# Patient Record
Sex: MALE | Race: WHITE
[De-identification: names, ages, dates, MRNs, and addresses within clinical notes are randomized per-mention and may not be internally consistent; named-entity substitution may affect disease eponyms.]

---

## 2019-01-09 ENCOUNTER — HOSPITAL ENCOUNTER (OUTPATIENT)
Dept: HOSPITAL 97 - OR | Age: 59
Discharge: HOME | End: 2019-01-09
Attending: SURGERY
Payer: COMMERCIAL

## 2019-01-09 VITALS — OXYGEN SATURATION: 95 % | SYSTOLIC BLOOD PRESSURE: 128 MMHG | DIASTOLIC BLOOD PRESSURE: 73 MMHG

## 2019-01-09 VITALS — TEMPERATURE: 97.4 F

## 2019-01-09 DIAGNOSIS — I10: ICD-10-CM

## 2019-01-09 DIAGNOSIS — C44.699: Primary | ICD-10-CM

## 2019-01-09 PROCEDURE — 88305 TISSUE EXAM BY PATHOLOGIST: CPT

## 2019-01-09 PROCEDURE — 0JBF0ZZ EXCISION OF LEFT UPPER ARM SUBCUTANEOUS TISSUE AND FASCIA, OPEN APPROACH: ICD-10-PCS

## 2019-01-09 NOTE — OP
Date of Procedure:  01/09/2019



Surgeon:  Osbaldo Richards MD



Preoperative Diagnosis:  Dermatofibrosarcoma, left posterior shoulder.



Postoperative Diagnosis:  Dermatofibrosarcoma, left posterior shoulder.



Procedures:  Wide excision of left posterior shoulder dermatofibrosarcoma protuberans, 12 x 6 cm and 
layered closure.



Estimated Blood Loss:  Minimal.



Specimen:  Margins of skin and subcutaneous tissue around the biopsy cavity.



Findings:  As above.



Anesthesia:  General.



Complications:  None.



Disposition:  The patient tolerated the procedure in stable condition and taken to recovery in good g
eneral condition.



Procedure In Detail:  The patient was brought to the OR and placed in supine position.  General anest
hesia was begun.  The patient was placed in the right lateral position, prepped and draped in the usu
al sterile fashion.  Marcaine 0.5% was infiltrated locally.  Then, from the previous scar, the patien
t had 2.5 cm margins aligned creating a wound that would be 12 x 6 cm and this was dissected all the 
way down through the subcutaneous tissue with a 15-blade, down to the muscle.  The muscle fascia was 
part of the specimen.  The entire biopsy cavity was excised and then labeled appropriately and then s
ent to Pathology.  Wound was irrigated.  Bleeding was controlled with cautery.  Flaps were created.  
The middle of the wound was difficult to close.  It will close secondarily.  The edges of the wound w
ere closed with #1 chromic for the subcutaneous tissue and 2-0 nylon for the skin.  Approximately a 6
 x 4 cm open wound remained at this time.  Wet-to-dry normal saline dressing change was applied.  The
 patient tolerated the procedure in stable condition and taken to recovery in good general condition.




Discharge Note:  The patient will go to day surgery, then home when stable.



Disposition:  Home.



Condition:  Stable.



Discharge Instructions:  Resume home meds and diet.  Activity as tolerated.  No heavy lifting.  Keep 
dressing on.  [facility] will [facility] the [facility], where I will order wou
nd VAC for the patient and Tylenol No. 3, 1 tablet p.o. q4 p.r.n. pain.





AS/MODL

DD:  01/09/2019 10:19:55Voice ID:  [id number]

DT:  01/09/2019 21:32:31Report ID:  [id number]